# Patient Record
Sex: FEMALE | Race: WHITE | NOT HISPANIC OR LATINO | URBAN - METROPOLITAN AREA
[De-identification: names, ages, dates, MRNs, and addresses within clinical notes are randomized per-mention and may not be internally consistent; named-entity substitution may affect disease eponyms.]

---

## 2021-12-06 NOTE — PATIENT DISCUSSION
PATIENT INSTRUCTED TO RETURN TO PCP FOR CONTINUED BLOOD SUGAR MONITORING AND RETURN FOR FOLLOW-UP AS SCHEDULED FOR DILATED FUNDUS EXAM.

## 2022-03-10 ENCOUNTER — ESTABLISHED PATIENT (OUTPATIENT)
Dept: URBAN - METROPOLITAN AREA CLINIC 21 | Facility: CLINIC | Age: 82
End: 2022-03-10

## 2022-03-10 DIAGNOSIS — H52.4: ICD-10-CM

## 2022-03-10 PROCEDURE — 92015 DETERMINE REFRACTIVE STATE: CPT

## 2022-03-10 PROCEDURE — 92014 COMPRE OPH EXAM EST PT 1/>: CPT

## 2022-03-10 ASSESSMENT — KERATOMETRY
OD_K1POWER_DIOPTERS: 44.00
OD_K2POWER_DIOPTERS: 44.00
OS_AXISANGLE2_DEGREES: 169
OS_K1POWER_DIOPTERS: 42.50
OD_AXISANGLE_DEGREES: 0
OS_K2POWER_DIOPTERS: 43.50
OS_AXISANGLE_DEGREES: 79
OD_AXISANGLE2_DEGREES: 90

## 2022-03-10 ASSESSMENT — VISUAL ACUITY
OU_SC: J6
OD_SC: 20/40
OD_PH: 20/30-1
OS_SC: 20/30+2

## 2022-03-10 ASSESSMENT — TONOMETRY
OD_IOP_MMHG: 16
OS_IOP_MMHG: 12

## 2023-11-15 ENCOUNTER — ESTABLISHED PATIENT (OUTPATIENT)
Dept: URBAN - METROPOLITAN AREA CLINIC 21 | Facility: CLINIC | Age: 83
End: 2023-11-15

## 2023-11-15 DIAGNOSIS — Z96.1: ICD-10-CM

## 2023-11-15 DIAGNOSIS — H16.223: ICD-10-CM

## 2023-11-15 DIAGNOSIS — H52.4: ICD-10-CM

## 2023-11-15 DIAGNOSIS — H35.371: ICD-10-CM

## 2023-11-15 PROCEDURE — 92014 COMPRE OPH EXAM EST PT 1/>: CPT

## 2023-11-15 ASSESSMENT — VISUAL ACUITY
OS_SC: J7
OS_SC: 20/30
OD_SC: 20/60
OD_SC: J5

## 2023-11-15 ASSESSMENT — TONOMETRY
OS_IOP_MMHG: 12
OD_IOP_MMHG: 15

## 2025-03-05 ENCOUNTER — COMPREHENSIVE EXAM (OUTPATIENT)
Age: 85
End: 2025-03-05

## 2025-03-05 DIAGNOSIS — H26.492: ICD-10-CM

## 2025-03-05 DIAGNOSIS — H35.371: ICD-10-CM

## 2025-03-05 DIAGNOSIS — H52.4: ICD-10-CM

## 2025-03-05 DIAGNOSIS — H43.813: ICD-10-CM

## 2025-03-05 DIAGNOSIS — Z96.1: ICD-10-CM

## 2025-03-05 DIAGNOSIS — H52.03: ICD-10-CM

## 2025-03-05 DIAGNOSIS — H52.203: ICD-10-CM

## 2025-03-05 PROCEDURE — 92014 COMPRE OPH EXAM EST PT 1/>: CPT

## 2025-03-05 PROCEDURE — 92015 DETERMINE REFRACTIVE STATE: CPT

## 2025-04-07 ENCOUNTER — CONTACT LENSES/GLASSES VISIT (OUTPATIENT)
Age: 85
End: 2025-04-07

## 2025-04-07 DIAGNOSIS — H52.03: ICD-10-CM

## 2025-04-07 PROCEDURE — 99211NC NO CHARGE VISIT
